# Patient Record
Sex: FEMALE | Race: WHITE | NOT HISPANIC OR LATINO | Employment: PART TIME | URBAN - METROPOLITAN AREA
[De-identification: names, ages, dates, MRNs, and addresses within clinical notes are randomized per-mention and may not be internally consistent; named-entity substitution may affect disease eponyms.]

---

## 2018-07-22 ENCOUNTER — APPOINTMENT (OUTPATIENT)
Dept: RADIOLOGY | Facility: MEDICAL CENTER | Age: 58
End: 2018-07-22
Attending: EMERGENCY MEDICINE
Payer: COMMERCIAL

## 2018-07-22 ENCOUNTER — HOSPITAL ENCOUNTER (OUTPATIENT)
Facility: MEDICAL CENTER | Age: 58
End: 2018-07-23
Attending: EMERGENCY MEDICINE | Admitting: HOSPITALIST
Payer: COMMERCIAL

## 2018-07-22 DIAGNOSIS — R07.9 ACUTE CHEST PAIN: ICD-10-CM

## 2018-07-22 DIAGNOSIS — I44.7 LBBB (LEFT BUNDLE BRANCH BLOCK): ICD-10-CM

## 2018-07-22 LAB
ALBUMIN SERPL BCP-MCNC: 4.3 G/DL (ref 3.2–4.9)
ALBUMIN/GLOB SERPL: 1.6 G/DL
ALP SERPL-CCNC: 91 U/L (ref 30–99)
ALT SERPL-CCNC: 16 U/L (ref 2–50)
ANION GAP SERPL CALC-SCNC: 14 MMOL/L (ref 0–11.9)
APTT PPP: 29 SEC (ref 24.7–36)
AST SERPL-CCNC: 24 U/L (ref 12–45)
BASOPHILS # BLD AUTO: 0.4 % (ref 0–1.8)
BASOPHILS # BLD: 0.03 K/UL (ref 0–0.12)
BILIRUB SERPL-MCNC: 0.4 MG/DL (ref 0.1–1.5)
BNP SERPL-MCNC: 42 PG/ML (ref 0–100)
BUN SERPL-MCNC: 11 MG/DL (ref 8–22)
CALCIUM SERPL-MCNC: 9 MG/DL (ref 8.5–10.5)
CHLORIDE SERPL-SCNC: 100 MMOL/L (ref 96–112)
CO2 SERPL-SCNC: 20 MMOL/L (ref 20–33)
CREAT SERPL-MCNC: 0.68 MG/DL (ref 0.5–1.4)
EKG IMPRESSION: NORMAL
EOSINOPHIL # BLD AUTO: 0.04 K/UL (ref 0–0.51)
EOSINOPHIL NFR BLD: 0.6 % (ref 0–6.9)
ERYTHROCYTE [DISTWIDTH] IN BLOOD BY AUTOMATED COUNT: 41.5 FL (ref 35.9–50)
GLOBULIN SER CALC-MCNC: 2.7 G/DL (ref 1.9–3.5)
GLUCOSE SERPL-MCNC: 100 MG/DL (ref 65–99)
HCT VFR BLD AUTO: 36 % (ref 37–47)
HGB BLD-MCNC: 12.3 G/DL (ref 12–16)
IMM GRANULOCYTES # BLD AUTO: 0.06 K/UL (ref 0–0.11)
IMM GRANULOCYTES NFR BLD AUTO: 0.8 % (ref 0–0.9)
INR PPP: 1.03 (ref 0.87–1.13)
LIPASE SERPL-CCNC: 11 U/L (ref 11–82)
LV EJECT FRACT  99904: 40
LV EJECT FRACT MOD 2C 99903: 64.71
LV EJECT FRACT MOD 4C 99902: 49.93
LV EJECT FRACT MOD BP 99901: 56.76
LYMPHOCYTES # BLD AUTO: 0.95 K/UL (ref 1–4.8)
LYMPHOCYTES NFR BLD: 13.4 % (ref 22–41)
MAGNESIUM SERPL-MCNC: 1.9 MG/DL (ref 1.5–2.5)
MCH RBC QN AUTO: 31.5 PG (ref 27–33)
MCHC RBC AUTO-ENTMCNC: 34.2 G/DL (ref 33.6–35)
MCV RBC AUTO: 92.1 FL (ref 81.4–97.8)
MONOCYTES # BLD AUTO: 0.63 K/UL (ref 0–0.85)
MONOCYTES NFR BLD AUTO: 8.9 % (ref 0–13.4)
NEUTROPHILS # BLD AUTO: 5.4 K/UL (ref 2–7.15)
NEUTROPHILS NFR BLD: 75.9 % (ref 44–72)
NRBC # BLD AUTO: 0 K/UL
NRBC BLD-RTO: 0 /100 WBC
PLATELET # BLD AUTO: 198 K/UL (ref 164–446)
PMV BLD AUTO: 10.4 FL (ref 9–12.9)
POTASSIUM SERPL-SCNC: 3.8 MMOL/L (ref 3.6–5.5)
PROT SERPL-MCNC: 7 G/DL (ref 6–8.2)
PROTHROMBIN TIME: 13.2 SEC (ref 12–14.6)
RBC # BLD AUTO: 3.91 M/UL (ref 4.2–5.4)
SODIUM SERPL-SCNC: 134 MMOL/L (ref 135–145)
TROPONIN I SERPL-MCNC: <0.01 NG/ML (ref 0–0.04)
TSH SERPL DL<=0.005 MIU/L-ACNC: 0.84 UIU/ML (ref 0.38–5.33)
WBC # BLD AUTO: 7.1 K/UL (ref 4.8–10.8)

## 2018-07-22 PROCEDURE — 71275 CT ANGIOGRAPHY CHEST: CPT

## 2018-07-22 PROCEDURE — 84443 ASSAY THYROID STIM HORMONE: CPT

## 2018-07-22 PROCEDURE — 93306 TTE W/DOPPLER COMPLETE: CPT | Mod: 26 | Performed by: INTERNAL MEDICINE

## 2018-07-22 PROCEDURE — 93010 ELECTROCARDIOGRAM REPORT: CPT | Performed by: INTERNAL MEDICINE

## 2018-07-22 PROCEDURE — 83880 ASSAY OF NATRIURETIC PEPTIDE: CPT

## 2018-07-22 PROCEDURE — 700117 HCHG RX CONTRAST REV CODE 255: Performed by: EMERGENCY MEDICINE

## 2018-07-22 PROCEDURE — 93005 ELECTROCARDIOGRAM TRACING: CPT

## 2018-07-22 PROCEDURE — 99285 EMERGENCY DEPT VISIT HI MDM: CPT

## 2018-07-22 PROCEDURE — 84484 ASSAY OF TROPONIN QUANT: CPT | Mod: 91

## 2018-07-22 PROCEDURE — 85025 COMPLETE CBC W/AUTO DIFF WBC: CPT

## 2018-07-22 PROCEDURE — 93005 ELECTROCARDIOGRAM TRACING: CPT | Performed by: EMERGENCY MEDICINE

## 2018-07-22 PROCEDURE — 36415 COLL VENOUS BLD VENIPUNCTURE: CPT

## 2018-07-22 PROCEDURE — 80053 COMPREHEN METABOLIC PANEL: CPT

## 2018-07-22 PROCEDURE — A9270 NON-COVERED ITEM OR SERVICE: HCPCS | Performed by: NURSE PRACTITIONER

## 2018-07-22 PROCEDURE — 93306 TTE W/DOPPLER COMPLETE: CPT

## 2018-07-22 PROCEDURE — 85610 PROTHROMBIN TIME: CPT

## 2018-07-22 PROCEDURE — 93005 ELECTROCARDIOGRAM TRACING: CPT | Performed by: HOSPITALIST

## 2018-07-22 PROCEDURE — 83735 ASSAY OF MAGNESIUM: CPT

## 2018-07-22 PROCEDURE — G0378 HOSPITAL OBSERVATION PER HR: HCPCS

## 2018-07-22 PROCEDURE — 85730 THROMBOPLASTIN TIME PARTIAL: CPT

## 2018-07-22 PROCEDURE — 71045 X-RAY EXAM CHEST 1 VIEW: CPT

## 2018-07-22 PROCEDURE — 83690 ASSAY OF LIPASE: CPT

## 2018-07-22 PROCEDURE — 99219 PR INITIAL OBSERVATION CARE,LEVL II: CPT | Performed by: HOSPITALIST

## 2018-07-22 PROCEDURE — 700102 HCHG RX REV CODE 250 W/ 637 OVERRIDE(OP): Performed by: NURSE PRACTITIONER

## 2018-07-22 RX ORDER — ALPRAZOLAM 0.25 MG/1
0.25 TABLET ORAL EVERY 6 HOURS PRN
Status: DISCONTINUED | OUTPATIENT
Start: 2018-07-22 | End: 2018-07-23 | Stop reason: HOSPADM

## 2018-07-22 RX ORDER — ESTRADIOL 0.1 MG/G
1 CREAM VAGINAL
COMMUNITY

## 2018-07-22 RX ORDER — LEVOTHYROXINE SODIUM 0.15 MG/1
150 TABLET ORAL
COMMUNITY

## 2018-07-22 RX ORDER — PROMETHAZINE HYDROCHLORIDE 25 MG/1
12.5-25 TABLET ORAL EVERY 4 HOURS PRN
Status: DISCONTINUED | OUTPATIENT
Start: 2018-07-22 | End: 2018-07-23 | Stop reason: HOSPADM

## 2018-07-22 RX ORDER — ACETAMINOPHEN 500 MG
1000 TABLET ORAL 2 TIMES DAILY PRN
COMMUNITY

## 2018-07-22 RX ORDER — BISACODYL 10 MG
10 SUPPOSITORY, RECTAL RECTAL
Status: DISCONTINUED | OUTPATIENT
Start: 2018-07-22 | End: 2018-07-23 | Stop reason: HOSPADM

## 2018-07-22 RX ORDER — LISINOPRIL 20 MG/1
20 TABLET ORAL
Status: DISCONTINUED | OUTPATIENT
Start: 2018-07-23 | End: 2018-07-23 | Stop reason: HOSPADM

## 2018-07-22 RX ORDER — ACETAMINOPHEN 325 MG/1
650 TABLET ORAL EVERY 6 HOURS PRN
Status: DISCONTINUED | OUTPATIENT
Start: 2018-07-22 | End: 2018-07-23 | Stop reason: HOSPADM

## 2018-07-22 RX ORDER — LORAZEPAM 2 MG/ML
0.5 INJECTION INTRAMUSCULAR ONCE
Status: COMPLETED | OUTPATIENT
Start: 2018-07-22 | End: 2018-07-23

## 2018-07-22 RX ORDER — DOXYCYCLINE 100 MG/1
100 TABLET ORAL EVERY 12 HOURS
Status: DISCONTINUED | OUTPATIENT
Start: 2018-07-22 | End: 2018-07-23 | Stop reason: HOSPADM

## 2018-07-22 RX ORDER — ENALAPRILAT 1.25 MG/ML
1.25 INJECTION INTRAVENOUS EVERY 6 HOURS PRN
Status: DISCONTINUED | OUTPATIENT
Start: 2018-07-22 | End: 2018-07-23 | Stop reason: HOSPADM

## 2018-07-22 RX ORDER — POLYETHYLENE GLYCOL 3350 17 G/17G
1 POWDER, FOR SOLUTION ORAL
Status: DISCONTINUED | OUTPATIENT
Start: 2018-07-22 | End: 2018-07-23 | Stop reason: HOSPADM

## 2018-07-22 RX ORDER — AMOXICILLIN 250 MG
2 CAPSULE ORAL 2 TIMES DAILY
Status: DISCONTINUED | OUTPATIENT
Start: 2018-07-22 | End: 2018-07-23 | Stop reason: HOSPADM

## 2018-07-22 RX ORDER — METRONIDAZOLE 10 MG/G
1 GEL TOPICAL DAILY
COMMUNITY

## 2018-07-22 RX ORDER — PROMETHAZINE HYDROCHLORIDE 25 MG/1
12.5-25 SUPPOSITORY RECTAL EVERY 4 HOURS PRN
Status: DISCONTINUED | OUTPATIENT
Start: 2018-07-22 | End: 2018-07-23 | Stop reason: HOSPADM

## 2018-07-22 RX ORDER — ONDANSETRON 4 MG/1
4 TABLET, ORALLY DISINTEGRATING ORAL EVERY 4 HOURS PRN
Status: DISCONTINUED | OUTPATIENT
Start: 2018-07-22 | End: 2018-07-23 | Stop reason: HOSPADM

## 2018-07-22 RX ORDER — LEVOTHYROXINE SODIUM 0.15 MG/1
150 TABLET ORAL
Status: DISCONTINUED | OUTPATIENT
Start: 2018-07-23 | End: 2018-07-23 | Stop reason: HOSPADM

## 2018-07-22 RX ORDER — ONDANSETRON 2 MG/ML
4 INJECTION INTRAMUSCULAR; INTRAVENOUS EVERY 4 HOURS PRN
Status: DISCONTINUED | OUTPATIENT
Start: 2018-07-22 | End: 2018-07-23 | Stop reason: HOSPADM

## 2018-07-22 RX ADMIN — IOHEXOL 100 ML: 350 INJECTION, SOLUTION INTRAVENOUS at 16:09

## 2018-07-22 RX ADMIN — ALPRAZOLAM 0.25 MG: 0.25 TABLET ORAL at 18:18

## 2018-07-22 ASSESSMENT — LIFESTYLE VARIABLES
TOTAL SCORE: 0
HOW MANY TIMES IN THE PAST YEAR HAVE YOU HAD 5 OR MORE DRINKS IN A DAY: 0
HAVE PEOPLE ANNOYED YOU BY CRITICIZING YOUR DRINKING: NO
AVERAGE NUMBER OF DAYS PER WEEK YOU HAVE A DRINK CONTAINING ALCOHOL: 3
ALCOHOL_USE: YES
CONSUMPTION TOTAL: NEGATIVE
EVER_SMOKED: NEVER
TOTAL SCORE: 0
HAVE YOU EVER FELT YOU SHOULD CUT DOWN ON YOUR DRINKING: NO
TOTAL SCORE: 0
EVER HAD A DRINK FIRST THING IN THE MORNING TO STEADY YOUR NERVES TO GET RID OF A HANGOVER: NO
EVER FELT BAD OR GUILTY ABOUT YOUR DRINKING: NO
EVER_SMOKED: NEVER
ON A TYPICAL DAY WHEN YOU DRINK ALCOHOL HOW MANY DRINKS DO YOU HAVE: 2

## 2018-07-22 ASSESSMENT — PATIENT HEALTH QUESTIONNAIRE - PHQ9
1. LITTLE INTEREST OR PLEASURE IN DOING THINGS: NOT AT ALL
SUM OF ALL RESPONSES TO PHQ9 QUESTIONS 1 AND 2: 0
2. FEELING DOWN, DEPRESSED, IRRITABLE, OR HOPELESS: NOT AT ALL

## 2018-07-22 ASSESSMENT — COPD QUESTIONNAIRES
IN THE PAST 12 MONTHS DO YOU DO LESS THAN YOU USED TO BECAUSE OF YOUR BREATHING PROBLEMS: DISAGREE/UNSURE
DO YOU EVER COUGH UP ANY MUCUS OR PHLEGM?: NO/ONLY WITH OCCASIONAL COLDS OR INFECTIONS
DO YOU EVER COUGH UP ANY MUCUS OR PHLEGM?: NO/ONLY WITH OCCASIONAL COLDS OR INFECTIONS
HAVE YOU SMOKED AT LEAST 100 CIGARETTES IN YOUR ENTIRE LIFE: NO/DON'T KNOW
DURING THE PAST 4 WEEKS HOW MUCH DID YOU FEEL SHORT OF BREATH: NONE/LITTLE OF THE TIME
DURING THE PAST 4 WEEKS HOW MUCH DID YOU FEEL SHORT OF BREATH: NONE/LITTLE OF THE TIME
COPD SCREENING SCORE: 0
COPD SCREENING SCORE: 1
HAVE YOU SMOKED AT LEAST 100 CIGARETTES IN YOUR ENTIRE LIFE: NO/DON'T KNOW

## 2018-07-22 ASSESSMENT — PAIN SCALES - GENERAL
PAINLEVEL_OUTOF10: 0
PAINLEVEL_OUTOF10: 0

## 2018-07-22 NOTE — ED NOTES
Pt BIB EMS. Pt started feeling anxious, had diarrhea, felt nauseous and dizzy, and had SOB and chest tightness. Sx have resolved but pt still has numbness and tingling in bilateral arms.  12 lead showed a new LBBB. Pt had been drinking last night and had coffee this morning, which is not normal for her. Similar episode once before.     Chief Complaint   Patient presents with   • Numbness   • Tingling

## 2018-07-23 ENCOUNTER — APPOINTMENT (OUTPATIENT)
Dept: RADIOLOGY | Facility: MEDICAL CENTER | Age: 58
End: 2018-07-23
Attending: HOSPITALIST
Payer: COMMERCIAL

## 2018-07-23 ENCOUNTER — PATIENT OUTREACH (OUTPATIENT)
Dept: HEALTH INFORMATION MANAGEMENT | Facility: OTHER | Age: 58
End: 2018-07-23

## 2018-07-23 VITALS
TEMPERATURE: 98.7 F | RESPIRATION RATE: 13 BRPM | SYSTOLIC BLOOD PRESSURE: 108 MMHG | HEIGHT: 65 IN | HEART RATE: 88 BPM | WEIGHT: 134.26 LBS | OXYGEN SATURATION: 98 % | DIASTOLIC BLOOD PRESSURE: 53 MMHG | BODY MASS INDEX: 22.37 KG/M2

## 2018-07-23 PROBLEM — F41.9 ANXIETY: Status: ACTIVE | Noted: 2018-07-23

## 2018-07-23 PROBLEM — R07.82 INTERCOSTAL PAIN: Status: ACTIVE | Noted: 2018-07-23

## 2018-07-23 PROBLEM — J20.8 ACUTE BRONCHITIS DUE TO OTHER SPECIFIED ORGANISMS: Status: ACTIVE | Noted: 2018-07-23

## 2018-07-23 PROBLEM — E03.4 HYPOTHYROIDISM DUE TO ACQUIRED ATROPHY OF THYROID: Status: ACTIVE | Noted: 2018-07-23

## 2018-07-23 PROBLEM — I10 ESSENTIAL HYPERTENSION: Status: ACTIVE | Noted: 2018-07-23

## 2018-07-23 LAB — EKG IMPRESSION: NORMAL

## 2018-07-23 PROCEDURE — 700102 HCHG RX REV CODE 250 W/ 637 OVERRIDE(OP): Performed by: HOSPITALIST

## 2018-07-23 PROCEDURE — G0378 HOSPITAL OBSERVATION PER HR: HCPCS

## 2018-07-23 PROCEDURE — 99217 PR OBSERVATION CARE DISCHARGE: CPT | Performed by: HOSPITALIST

## 2018-07-23 PROCEDURE — 700111 HCHG RX REV CODE 636 W/ 250 OVERRIDE (IP)

## 2018-07-23 PROCEDURE — A9502 TC99M TETROFOSMIN: HCPCS

## 2018-07-23 PROCEDURE — 700111 HCHG RX REV CODE 636 W/ 250 OVERRIDE (IP): Performed by: HOSPITALIST

## 2018-07-23 PROCEDURE — A9270 NON-COVERED ITEM OR SERVICE: HCPCS | Performed by: HOSPITALIST

## 2018-07-23 PROCEDURE — 96374 THER/PROPH/DIAG INJ IV PUSH: CPT

## 2018-07-23 RX ORDER — METOPROLOL SUCCINATE 25 MG/1
25 TABLET, EXTENDED RELEASE ORAL DAILY
Qty: 30 TAB | Refills: 3 | Status: SHIPPED | OUTPATIENT
Start: 2018-07-23

## 2018-07-23 RX ORDER — REGADENOSON 0.08 MG/ML
INJECTION, SOLUTION INTRAVENOUS
Status: COMPLETED
Start: 2018-07-23 | End: 2018-07-23

## 2018-07-23 RX ORDER — ATORVASTATIN CALCIUM 40 MG/1
40 TABLET, FILM COATED ORAL DAILY
Qty: 30 TAB | Refills: 1 | Status: SHIPPED | OUTPATIENT
Start: 2018-07-23

## 2018-07-23 RX ORDER — DOXYCYCLINE 100 MG/1
100 TABLET ORAL EVERY 12 HOURS
Qty: 10 TAB | Refills: 0 | Status: SHIPPED | OUTPATIENT
Start: 2018-07-23

## 2018-07-23 RX ORDER — LISINOPRIL 10 MG/1
10 TABLET ORAL DAILY
Qty: 30 TAB | Refills: 1 | Status: SHIPPED | OUTPATIENT
Start: 2018-07-24

## 2018-07-23 RX ADMIN — LEVOTHYROXINE SODIUM 150 MCG: 150 TABLET ORAL at 06:34

## 2018-07-23 RX ADMIN — LORAZEPAM 0.5 MG: 2 INJECTION INTRAMUSCULAR; INTRAVENOUS at 07:11

## 2018-07-23 RX ADMIN — LISINOPRIL 20 MG: 20 TABLET ORAL at 06:34

## 2018-07-23 RX ADMIN — REGADENOSON 0.4 MG: 0.08 INJECTION, SOLUTION INTRAVENOUS at 08:02

## 2018-07-23 ASSESSMENT — ENCOUNTER SYMPTOMS
VOMITING: 0
NAUSEA: 0
SENSORY CHANGE: 1
DIARRHEA: 0
SPUTUM PRODUCTION: 1
SHORTNESS OF BREATH: 1
WEIGHT LOSS: 0
ABDOMINAL PAIN: 0
CHILLS: 0
COUGH: 1
DIAPHORESIS: 0
PALPITATIONS: 0
EYE PAIN: 0
NERVOUS/ANXIOUS: 1
SORE THROAT: 1
BLURRED VISION: 0
HEARTBURN: 0
FEVER: 0
ORTHOPNEA: 0
PHOTOPHOBIA: 0
DOUBLE VISION: 0

## 2018-07-23 ASSESSMENT — PAIN SCALES - GENERAL: PAINLEVEL_OUTOF10: 0

## 2018-07-23 NOTE — PROGRESS NOTES
Pt is resting/sleeping. Call light within reach. On SR w LBBB rhythm, HR is 80's. VS stable. No other needs at this time. Will continue to monitor

## 2018-07-23 NOTE — CARE PLAN
Problem: Communication  Goal: The ability to communicate needs accurately and effectively will improve  Outcome: PROGRESSING AS EXPECTED      Problem: Safety  Goal: Will remain free from injury  Outcome: PROGRESSING AS EXPECTED      Problem: Pain Management  Goal: Pain level will decrease to patient's comfort goal  Outcome: PROGRESSING AS EXPECTED

## 2018-07-23 NOTE — DISCHARGE SUMMARY
"Discharge Summary    CHIEF COMPLAINT ON ADMISSION  Chief Complaint   Patient presents with   • Numbness   • Tingling   • Chest Pain       Reason for Admission  Chest pain    Admission Date  7/22/2018    CODE STATUS  Full Code    HPI & HOSPITAL COURSE  58 y.o. female who presented 7/22/2018 with  Chest tightness. It started around 1030 am. It was associated with bilateral arm numbness. She was also short of breath.She  Was nauseous .she had one  Episode of diarrhea.Her  describes her as an anxious person. She was anxious during this episode. In the ER, her BP was elevated and remain elevated. She has a family hx of MI.she states she had been dealing with a\"viral issue\" for about a week and now she is coughing up greenish sputum. No fever or chills.her EKG show new LBBB.    She was started on po doxycycline for suspected acute bronchitis. Echo showed akinesis anteroseptal wall. Ef 40%. Stress test shows old infarct. No reversible ischemia    He bp was elevated during her admission    She was anxious    With findings c/w old heart attack and reduced cardiac function and elevated bp--> she was started on asa, statin, aceI, b blocker     Therefore, she is discharged in good and stable condition to home with close outpatient follow-up.    The patient recovered much more quickly than anticipated on admission.    Discharge Date  7/23    FOLLOW UP ITEMS POST DISCHARGE  pcp 1 week    DISCHARGE DIAGNOSES  Principal Problem:    Intercostal pain POA: Yes  Active Problems:    Essential hypertension POA: Yes    Hypothyroidism due to acquired atrophy of thyroid POA: Yes    Acute bronchitis due to other specified organisms POA: Yes    Anxiety POA: Yes  Resolved Problems:    * No resolved hospital problems. *      FOLLOW UP  No future appointments.  No follow-up provider specified.    MEDICATIONS ON DISCHARGE     Medication List      START taking these medications      Instructions   aspirin  MG Tbec  Commonly known as:  " ECOTRIN   Take 1 Tab by mouth every day.  Dose:  325 mg     atorvastatin 40 MG Tabs  Commonly known as:  LIPITOR   Take 1 Tab by mouth every day.  Dose:  40 mg     doxycycline monohydrate 100 MG tablet  Commonly known as:  ADOXA   Take 1 Tab by mouth every 12 hours.  Dose:  100 mg     lisinopril 10 MG Tabs  Start taking on:  2018  Commonly known as:  PRINIVIL   Take 1 Tab by mouth every day.  Dose:  10 mg     metoprolol SR 25 MG Tb24  Commonly known as:  TOPROL XL   Take 1 Tab by mouth every day.  Dose:  25 mg        CONTINUE taking these medications      Instructions   acetaminophen 500 MG Tabs  Commonly known as:  TYLENOL   Take 1,000 mg by mouth 2 times a day as needed (pain).  Dose:  1000 mg     ESTRACE VAGINAL 0.1 MG/GM vaginal cream  Generic drug:  estradiol   Insert 1 g in vagina every 7 days.   Dose:  1 g     levothyroxine 150 MCG Tabs  Commonly known as:  SYNTHROID   Take 150 mcg by mouth Every morning on an empty stomach.  Dose:  150 mcg     METROGEL 1 % gel  Generic drug:  metronidazole   Apply 1 Application to affected area(s) every day. face  Dose:  1 Application            Allergies  Allergies   Allergen Reactions   • Fosamax    • Lidocaine    • Sulfa Drugs        DIET  Orders Placed This Encounter   Procedures   • Diet Order Cardiac     Standing Status:   Standing     Number of Occurrences:   1     Order Specific Question:   Diet:     Answer:   Cardiac [6]     Order Specific Question:   Miscellaneous modifications:     Answer:   Gluten Free per PT [10]     Order Specific Question:   Miscellaneous modifications:     Answer:   No Decaf, No Caffeine(for test) [11]       ACTIVITY  As tolerated.  Weight bearing as tolerated    CONSULTATIONS  Dr aviles cardiology    PROCEDURES  Patient Information     Patient Name  Melanie Marie ([id number]) Sex  Female   1960   Room Bed Code Status Current Location   T207 00 FULL 00   Reprint Order Requisition     NM-CARDIAC STRESS TEST (Order  #[id number]) on 18   Linked Documents     View [facility] Report   Last Resulted Time     9:21 AM   Images     Show images for NM-CARDIAC STRESS TEST   Imaging Result Status     Status: Edited Result - FINAL (Exam End: 2018  9:04 AM)   Imaging Previous Results     Open Hard Copy Result Report (Order #[id number] - NM-CARDIAC STRESS TEST)   Narrative                      Myocardial Perfusion   Report   NUCLEAR IMAGING INTERPRETATION   No reversible defects that would indicate ischemia.    Non-reversible defects noted consistent with infarcts.   Mildly reduced left ventricular systolic function.    ECG INTERPRETATION   Nondiagnostic for ischemia due to left bundle branch block.     KATIE AMATO     MRN:    [id number]         Gender:    F     Exam Date: 2018 07:23     Exam Location:      Inpatient     Ordering Phys:     DANA FRANCES     NucMed Tech:       Melvi Peres, RT                       (N)     Age:    58    :    1960        Ht (in):     65     Wt (lb):     134    BMI:    22.32       Radiologist     Risk Factors:             Family history of coronary disease     Indications:              Chest pain, unspecified     ICD Codes:                R079     Cardiac History:          Positive risk factors     Cardiac Meds:     Meds Past 24 hrs:     Pretest Chest Pain:     STRESS TEST      Pharmacologi                    c   Levy   Lexiscan       Dose: 0.4 mg   ol:                       Post-Injection Exercise:               Resting HR (bpm):      83     Peak HR (bpm):         100     Resting BP (mmHg):       134    /   89     Peak BP (mmHg):       150   /   70     MaxPHR:     164     Target HR (bpm):       139     % MaxPHR:     61     Double Product:       73103     BP Response:     Stress Termination:     Stress Symptoms:   Arrhythmia: None Chest Pain: None Resting ECG LBBB     ECG     Resting ECG:     Sinus rhythm with left bundle branch block.     Stress ECG:       Nondiagnostic due to left bundle branch block.     IMAGE PROTOCOL      Rest/Stress 1                        Day             RadiopharmaceuticalDose (mCi)   Imaging  Date      Imaging  Time           Inj to Img Time (min)   Rest:   Tc-99m             7.3          23-Jul-2018        07:42                   15           Tetrofosmin   Stress: Tc-99m             25.2         23-Jul-2018        08:34                   15           Tetrofosmin     Rest:   Administration Site:       Right antecubital                               fossa   Administered by:      Melvi Peres RT (N)     Stress:   Administration Site:       Right antecubital                               fossa   Administered by:      Keesha Luis RN     % Percent HR Achieved:   SPECT RESULTS     Technical Quality:       Excellent     Raw Data Analysis:   Summed Stress Score:    4   Summed Rest Score:    0   Summed Difference Score:        10   PERFUSION:   Small sized, nonreversible, decreased uptake of moderate severity in the mid    anteroseptal (LAD) segment during post stress images.     Small sized, nonreversible, decreased uptake of moderate severity in the mid    inferoseptal (RCA) segment during post stress images.     Homogenous normal tracer uptake of the myocardium during rest and stress in    all other segments     FUNCTIONAL RESULTS (calculated via Gated SPECT)     Stress Image LV EF:        45     %     Upper Normal Limit     Stress EDV:      103    ml   EDVI:    62      ml/m²     Stress ESV:      57     ml   ESVI:    34      ml/m²     TID:    0.95   TID - 1.19      TID (ed) - 1.23   LV Function:   Gated images show:        - a large sized area of dyskinesis in the anterior (LAD), apical septal    (LAD) and anteroseptal (LAD) segments        - a medium sized area of akinesis in the inferoseptal (RCA), mid    inferior (RCA) and basal inferior (RCA) segments                   Sanjeev An   Edited by: Michelle Jefferson MD   (Electronically  Signed)   Final Date:      2018                     09:11   Amended:         2018 09:21   Reading Provider Reading Date   No Reading Provider Prelim 2018     -------------------------------------------------------------------------------------    Order   ECHOCARDIOGRAM COMP W/O CONT [[id number]] (Order [id number])   Patient Information     Patient Name  Melanie Amato ([id number]) Sex  Female   1960   Room Bed Code Status Current Location   T207 00 FULL 00   Reprint Order Requisition     ECHOCARDIOGRAM COMP W/O CONT (Order #[id number]) on 18   Linked Documents     View [facility] Report   Last Resulted Time   Sun 2018  6:04 PM   Images     Show images for ECHOCARDIOGRAM COMP W/O CONT   Imaging Result Status     Status: Final result (Collected: 2018  4:51 PM)   Imaging Previous Results     Open Hard Copy Result Report (Order #[id number] - ECHOCARDIOGRAM COMP W/O CONT)   Narrative     Transthoracic  Echo Report      Echocardiography Laboratory    CONCLUSIONS  Moderately reduced left ventricular systolic function.  Left ventricular ejection fraction is visually estimated to be 40%.  There is akinesis of the anteroseptal wall.  There is hypokinesis of the inferior and anterior wall.  Grade I diastolic dysfunction.  The right ventricle was normal in size and function.  Mild mitral regurgitation.  Normal estimated right atrial pressure.    No prior study is available for comparison.       MELANIE AMATO  Exam Date:         2018                      16:51  Exam Location:     Inpatient  Priority:          Routine    Ordering Physician:        BRII LUX                               ([id number])  Referring Physician:       [id number] J Hill  Sonographer:               Libertad Combs RDCS    Age:    58     Gender:    F  MRN:    [id number]  :    1960  BSA:    1.67   Ht (in):    65     Wt (lb):    134  Exam Type:     Complete    Indications:     Chest pain,  unspecified  ICD Codes:       R079    CPT Codes:       85096    BP:          /          HR:   86  Technical Quality:       Fair    MEASUREMENTS  (Male / Female) Normal Values  2D ECHO  LV Diastolic Diameter PLAX        3.9 cm                4.2 - 5.9 / 3.9 - 5.3   cm  LV Systolic Diameter PLAX         3.2 cm                2.1 - 4.0 cm  IVS Diastolic Thickness           1.2 cm                  LVPW Diastolic Thickness          1 cm                    LVOT Diameter                     1.8 cm                  Estimated LV Ejection Fraction    40 %                    LV Ejection Fraction MOD BP       56.8 %                >= 55  %  LV Ejection Fraction MOD 4C       49.9 %                  LV Ejection Fraction MOD 2C       64.7 %                  IVC Diameter                      1.3 cm                    M-MODE  Aortic Root Diameter MM           2.7 cm                    DOPPLER  AV Peak Velocity                  1.7 m/s                 AV Peak Gradient                  12 mmHg                 AV Mean Gradient                  7.1 mmHg                LVOT Peak Velocity                1.1 m/s                 AV Area Cont Eq vti               1.6 cm²                 Mitral E Point Velocity           0.89 m/s                Mitral E to A Ratio               0.62                    Mitral A Duration                 90 ms                   MV Pressure Half Time             47.2 ms                 MV Area PHT                       4.7 cm²                 MV Deceleration Time              163 ms                  Pulmonary Vein Systolic Velocity  0.68 m/s                Pulmonary Vein Diastolic Velocit  0.42 m/s                Pulmonary Vein S/D Ratio          1.6                     Pulmonary Vein A Velocity         0.36 m/s                PV Peak Velocity                  1.3 m/s                 PV Peak Gradient                  6.3 mmHg                RVOT Peak Velocity                1.1 m/s                   * Indicates  values subject to auto-interpretation  LV EF:  40    %    FINDINGS  Left Ventricle  Normal left ventricular chamber size. Normal left ventricular wall   thickness. Moderately reduced left ventricular systolic function. Left   ventricular ejection fraction is visually estimated to be 40%. There is   akinesis of the anteroseptal wall.  There is hypokinesis of the   inferior and anterior wall.  Grade I diastolic dysfunction.    Right Ventricle  The right ventricle was normal in size and function.    Right Atrium  The right atrium is normal in size.  Normal inferior vena cava size and   inspiratory collapse. Normal estimated right atrial pressure.    Left Atrium  The left atrium is normal in size.  Left atrial volume index is 23   mL/sq m.    Mitral Valve  Structurally normal mitral valve without significant stenosis. Mild   mitral regurgitation.    Aortic Valve  Structurally normal aortic valve without significant stenosis or   regurgitation.    Tricuspid Valve  Structurally normal tricuspid valve without significant stenosis or   regurgitation. Unable to estimate pulmonary artery pressure due to an   inadequate tricuspid regurgitant jet.    Pulmonic Valve  Structurally normal pulmonic valve without significant stenosis or   regurgitation.    Pericardium  Normal pericardium without effusion.    Aorta  The aortic root is normal.                      Tejas Santamaria MD  (Electronically Signed)  Final Date:     22 July 2018                   18:04         LABORATORY  Lab Results   Component Value Date    SODIUM 134 (L) 07/22/2018    POTASSIUM 3.8 07/22/2018    CHLORIDE 100 07/22/2018    CO2 20 07/22/2018    GLUCOSE 100 (H) 07/22/2018    BUN 11 07/22/2018    CREATININE 0.68 07/22/2018        Lab Results   Component Value Date    WBC 7.1 07/22/2018    HEMOGLOBIN 12.3 07/22/2018    HEMATOCRIT 36.0 (L) 07/22/2018    PLATELETCT 198 07/22/2018        Total time of the discharge process exceeds 36 minutes.

## 2018-07-23 NOTE — PROGRESS NOTES
Received report from Gurpreet LAMAS. Assume Pt care. Pt is sitting up on bedside, family at bedside. Instructed to call for assistance. Will continue to monitor

## 2018-07-23 NOTE — DISCHARGE PLANNING
Care Transition Team Assessment    Met with pt at bedside. 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 Pt is independent at baseline, no needs identified at this time. Pt confirmed her  will provide transport at discharge.    Information Source  Orientation : Oriented x 4  Information Given By: Patient  Informant's Name: Melanie Marie    Readmission Evaluation  Is this a readmission?: No    Interdisciplinary Discharge Planning  Does Admitting Nurse Feel This Could be a Complex Discharge?: No  Primary Care Physician: Dr Mohr  Lives with - Patient's Self Care Capacity: Spouse, Parents, Adult Children  Patient or legal guardian wants to designate a caregiver (see row info): No  Support Systems: Spouse / Significant Other  Housing / Facility: [address]  Do You Take your Prescribed Medications Regularly: Yes  Able to Return to Previous ADL's: Yes  Mobility Issues: No  Prior Services: None  Patient Expects to be Discharged to:: Home  Assistance Needed: No  Durable Medical Equipment: Not Applicable    Discharge Preparedness  What are your discharge supports?: Spouse  Prior Functional Level: Ambulatory, Independent with Activities of Daily Living, Independent with Medication Management  Difficulity with ADLs: None  Difficulity with IADLs: None    Functional Assesment  Prior Functional Level: Ambulatory, Independent with Activities of Daily Living, Independent with Medication Management    Finances  Prescription Coverage: Yes    Discharge Risks or Barriers  Discharge risks or barriers?: No    Anticipated Discharge Information  Anticipated discharge disposition: Home  Discharge Address: [address] [place] [address]  Discharge Contact Phone Number: Spouse Woo [phone]

## 2018-07-23 NOTE — PROGRESS NOTES
Pt dc'd home. IV and monitor removed. Pt left unit via walking with , Refused hospital escort. Personal belongings with pt when leaving unit. Pt given discharge instructions prior to leaving unit including prescription and when to visit with physician; verbalizes understanding. Copy of discharge instructions with pt and in the chart.

## 2018-07-23 NOTE — PROGRESS NOTES
Admit profile and assessment completed.  Pt A&Ox4, anxious, medicated per MAR.  Respirations even, unlabored on room air.  Pt denies any pain at this time.  Pt denies any CP, SOB, nausea, palpitations at this time.  Monitors applied, sinus rhythm noted.  Pt updated on POC: trending troponin, chemical stress test.   Call light and belongings within reach. Communication board.  Family at bedside. Needs met, will continue to monitor.

## 2018-07-23 NOTE — PROGRESS NOTES
Seen pt, AOx 4. On cardiac monitor with SR w LBBB. Denies chest pain. Plan of care discussed includes [facility], labs, cardiac monitoring, stress test (diet/meds restrictions instructed) and pt understands.

## 2018-07-23 NOTE — ED PROVIDER NOTES
ED Provider Note    CHIEF COMPLAINT  Chief Complaint   Patient presents with   • Numbness   • Tingling   • Chest Pain       HPI  Melanie Marie is a 58 y.o. female who presents to emergency room today with complaint of chest pain/tightness, numbness and tingling difficulty breathing. Patient's symptoms started around 10 to 10:30 AM. And numbness to her arms and legs, felt like she was going pass out with tightness to her chest cannot breathe. Wall pain which radiated to her back between her shoulder blades and upper neck described as tearing. Time to arrived by ambulance to the emergency room she felt improved and better her symptoms were not completely gone. Is noted that she had a left bundle-branch block on her EKG.    REVIEW OF SYSTEMS  See HPI for further details. All other systems are negative.     PAST MEDICAL HISTORY  No past medical history on file.    FAMILY HISTORY  [unfilled]    SOCIAL HISTORY  Social History     Social History   • Marital status:      Spouse name: N/A   • Number of children: N/A   • Years of education: N/A     Social History Main Topics   • Smoking status: Never Smoker   • Smokeless tobacco: Never Used   • Alcohol use Yes      Comment: 3-4x/week   • Drug use: No   • Sexual activity: Not on file     Other Topics Concern   • Not on file     Social History Narrative   • No narrative on file       SURGICAL HISTORY  No past surgical history on file.    CURRENT MEDICATIONS  Home Medications     Reviewed by Pam Zapata (Pharmacy Tech) on 07/22/18 at 1715  Med List Status: Complete   Medication Last Dose Status   acetaminophen (TYLENOL) 500 MG Tab 7/19/2018 Active   estradiol (ESTRACE VAGINAL) 0.1 MG/GM vaginal cream 7/18/2018 Active   levothyroxine (SYNTHROID) 150 MCG Tab 7/22/2018 Active   metronidazole (METROGEL) 1 % gel > 3 days Active                ALLERGIES  Allergies   Allergen Reactions   • Fosamax    • Lidocaine    • Sulfa Drugs        PHYSICAL EXAM  VITAL SIGNS: BP  "141/70   Pulse 82   Temp 36.8 °C (98.2 °F)   Resp 18   Ht 1.651 m (5' 5\")   Wt 60.9 kg (134 lb 4.2 oz)   SpO2 98%   Breastfeeding? No   BMI 22.34 kg/m²  Room air O2: 99    Constitutional: Well developed, Well nourished, acute distress, Non-toxic appearance.   HENT: Normocephalic, Atraumatic, Bilateral external ears normal, Oropharynx moist, No oral exudates, Nose normal.   Eyes: PERRLA, EOMI, Conjunctiva normal, No discharge.   Neck: Normal range of motion, No tenderness, Supple, No stridor.   Lymphatic: No lymphadenopathy noted.   Cardiovascular: Normal heart rate, Normal rhythm, No murmurs, No rubs, No gallops.   Thorax & Lungs: Normal breath sounds, No respiratory distress, No wheezing, No chest tenderness.   Abdomen: Bowel sounds normal, Soft, No tenderness, No masses, No pulsatile masses.   Skin: Warm, Dry, No erythema, No rash.   Back: No tenderness, No CVA tenderness.   Extremities: Intact distal pulses, No edema, No tenderness, No cyanosis, No clubbing.   Musculoskeletal: Good range of motion in all major joints. No tenderness to palpation or major deformities noted.   Neurologic: Alert & oriented x 3, Normal motor function, Normal sensory function, No focal deficits noted.   Psychiatric: Affect normal, Judgment normal, Mood anxious.     EKG  Normal sinus rhythm 80 beats per minute, no ST elevation or depression, widening of the QRS complex, poorly progression and left axis deviation secondary to left bundle-branch block, [place] intervals are normal. No diagnostic Q waves are noted. As compared to previous EKG dated 2013 there is interval change a left bundle branch block.    RADIOLOGY/PROCEDURES  ECHOCARDIOGRAM COMP W/O CONT   Final Result      CT-CTA CHEST WITH & W/O-POST PROCESS   Final Result      Unremarkable CT scan of the thoracic aorta without evidence of aneurysm or dissection.      Left lower lobe atelectasis or scarring.         DX-CHEST-LIMITED (1 VIEW)   Final Result         No acute " cardiopulmonary abnormalities are identified.            COURSE & MEDICAL DECISION MAKING  Pertinent Labs & Imaging studies reviewed. (See chart for details)  Patient is new left bundle-branch block with chest pain she'll be admitted to hospital for echocardiogram and stress test, discussed with cardiology as well as with hospitalist. Discussed with patient's physician through [facility] they agree with the above treatment. EKG as stated in his left bundle-branch block troponin so far normal. Patient's multiple risk factors which include family history mother/uncle. Consults her and also was possible dissection so CT scan was performed and this was negative she had tearing back pain between her shoulder blades.    FINAL IMPRESSION  1. Acute chest pain  2. Left bundle-branch block  3.         Electronically signed by: Martin Hawkins, 7/22/2018 6:53 PM

## 2018-07-23 NOTE — DISCHARGE INSTRUCTIONS
Discharge Instructions    Discharged to home by car with relative. Discharged via walking, hospital escort: Refused.  Special equipment needed: Not Applicable    Be sure to schedule a follow-up appointment with your primary care doctor or any specialists as instructed.     Discharge Plan:   Diet Plan: Discussed  Activity Level: Discussed  Confirmed Follow up Appointment: Patient to Call and Schedule Appointment  Confirmed Symptoms Management: Discussed  Medication Reconciliation Updated: Yes  Influenza Vaccine Indication: Patient Refuses    I understand that a diet low in cholesterol, fat, and sodium is recommended for good health. Unless I have been given specific instructions below for another diet, I accept this instruction as my diet prescription.   Other diet: Heart Healthy     Special Instructions: None    · Is patient discharged on Warfarin / Coumadin?   No     Depression / Suicide Risk    As you are discharged from this [facility] facility, it is important to learn how to keep safe from harming yourself.    Recognize the warning signs:  · Abrupt changes in personality, positive or negative- including increase in energy   · Giving away possessions  · Change in eating patterns- significant weight changes-  positive or negative  · Change in sleeping patterns- unable to sleep or sleeping all the time   · Unwillingness or inability to communicate  · Depression  · Unusual sadness, discouragement and loneliness  · Talk of wanting to die  · Neglect of personal appearance   · Rebelliousness- reckless behavior  · Withdrawal from people/activities they love  · Confusion- inability to concentrate     If you or a loved one observes any of these behaviors or has concerns about self-harm, here's what you can do:  · Talk about it- your feelings and reasons for harming yourself  · Remove any means that you might use to hurt yourself (examples: pills, rope, extension cords, firearm)  · Get professional help from the  community (Mental Health, Substance Abuse, psychological counseling)  · Do not be alone:Call your Safe Contact- someone whom you trust who will be there for you.  · Call your local CRISIS HOTLINE 111-1389 or 292-203-0283  · Call your local Children's Mobile Crisis Response Team [place] (321) 020-3717 or www.[employer]  · Call the toll free National Suicide Prevention Hotlines   · National Suicide Prevention Lifeline 132-682-JXWH (9080)  · National Hope Line Network 800-SUICIDE (378-6053)      Chest Pain Observation  It is often hard to give a specific diagnosis for the cause of chest pain. Among other possibilities your symptoms might be caused by inadequate oxygen delivery to your heart (angina). Angina that is not treated or evaluated can lead to a heart attack (myocardial infarction) or death.  Blood tests, electrocardiograms, and X-rays may have been done to help determine a possible cause of your chest pain. After evaluation and observation, your health care provider has determined that it is unlikely your pain was caused by an unstable condition that requires hospitalization. However, a full evaluation of your pain may need to be completed, with additional diagnostic testing as directed. It is very important to keep your follow-up appointments. Not keeping your follow-up appointments could result in permanent heart damage, disability, or death. If there is any problem keeping your follow-up appointments, you must call your health care provider.  HOME CARE INSTRUCTIONS   Due to the slight chance that your pain could be angina, it is important to follow your health care provider's treatment plan and also maintain a healthy lifestyle:  · Maintain or work toward achieving a healthy weight.  · Stay physically active and exercise regularly.  · Decrease your salt intake.  · Eat a balanced, healthy diet. Talk to a dietitian to learn about heart-healthy foods.  · Increase your fiber intake by including  whole grains, vegetables, fruits, and nuts in your diet.  · Avoid situations that cause stress, anger, or depression.  · Take medicines as advised by your health care provider. Report any side effects to your health care provider. Do not stop medicines or adjust the dosages on your own.  · Quit smoking. Do not use nicotine patches or gum until you check with your health care provider.  · Keep your blood pressure, blood sugar, and cholesterol levels within normal limits.  · Limit alcohol intake to no more than 1 drink per day for women who are not pregnant and 2 drinks per day for men.  · Do not abuse drugs.  SEEK IMMEDIATE MEDICAL CARE IF:  You have severe chest pain or pressure which may include symptoms such as:  · You feel pain or pressure in your arms, neck, jaw, or back.  · You have severe back or abdominal pain, feel sick to your stomach (nauseous), or throw up (vomit).  · You are sweating profusely.  · You are having a fast or irregular heartbeat.  · You feel short of breath while at rest.  · You notice increasing shortness of breath during rest, sleep, or with activity.  · You have chest pain that does not get better after rest or after taking your usual medicine.  · You wake from sleep with chest pain.  · You are unable to sleep because you cannot breathe.  · You develop a frequent cough or you are coughing up blood.  · You feel dizzy, faint, or experience extreme fatigue.  · You develop severe weakness, dizziness, fainting, or chills.  Any of these symptoms may represent a serious problem that is an emergency. Do not wait to see if the symptoms will go away. Call your local emergency services (911 in the [place] Do not drive yourself to the hospital.  MAKE SURE YOU:  · Understand these instructions.  · Will watch your condition.  · Will get help right away if you are not doing well or get worse.     This information is not intended to replace advice given to you by your health care provider. Make sure you  discuss any questions you have with your health care provider.     Document Released: 01/20/2012 Document Revised: 12/23/2014 Document Reviewed: 06/19/2014  Elsevier Interactive Patient Education ©2016 [employer]

## 2018-07-23 NOTE — H&P
"Hospital Medicine History & Physical Note    Date of Service  7/22/2018    Primary Care Physician  [facility]    Consultants    None      Code Status  full    Chief Complaint  Chest pain    History of Presenting Illness  58 y.o. female who presented 7/22/2018 with  Chest tightness. It started around 1030 am. It was associated with bilateral arm numbness. She was also short of breath.She  Was nauseous .she had one  Episode of diarrhea.Her  describes her as an anxious person. She was anxious during this episode. In the ER, her BP was elevated and remain elevated. She has a family hx of MI.she states she had been dealing with a\"viral issue\" for about a week and now she is coughing up greenish sputum. No fever or chills.her EKG show new LBBB.    Review of Systems  Review of Systems   Constitutional: Negative for chills, diaphoresis, fever, malaise/fatigue and weight loss.   HENT: Positive for sore throat. Negative for ear discharge, ear pain, hearing loss and tinnitus.    Eyes: Negative for blurred vision, double vision, photophobia and pain.   Respiratory: Positive for cough, sputum production and shortness of breath.    Cardiovascular: Positive for chest pain. Negative for palpitations, orthopnea and leg swelling.   Gastrointestinal: Negative for abdominal pain, diarrhea, heartburn, nausea and vomiting.   Genitourinary: Negative for dysuria, frequency, hematuria and urgency.   Neurological: Positive for sensory change.   Psychiatric/Behavioral: The patient is nervous/anxious.    All other systems reviewed and are negative.      Past Medical History   has no past medical history on file.    Surgical History   has no past surgical history on file.     Family History  family history is not on file.     Social History   reports that she has never smoked. She has never used smokeless tobacco. She reports that she drinks alcohol. She reports that she does not use drugs.    Allergies  Allergies   Allergen " Reactions   • Fosamax    • Lidocaine    • Sulfa Drugs        Medications  Prior to Admission Medications   Prescriptions Last Dose Informant Patient Reported? Taking?   acetaminophen (TYLENOL) 500 MG Tab 7/19/2018 at PRN Patient Yes Yes   Sig: Take 1,000 mg by mouth 2 times a day as needed (pain).   estradiol (ESTRACE VAGINAL) 0.1 MG/GM vaginal cream 7/18/2018 at PM Patient Yes Yes   Sig: Insert 1 g in vagina every 7 days. Wednesdays   levothyroxine (SYNTHROID) 150 MCG Tab 7/22/2018 at 0900 Patient Yes Yes   Sig: Take 150 mcg by mouth Every morning on an empty stomach.   metronidazole (METROGEL) 1 % gel > 3 days at PM Patient Yes Yes   Sig: Apply 1 Application to affected area(s) every day. face      Facility-Administered Medications: None       Physical Exam  Blood Pressure: 134/77   Temperature: 36.8 °C (98.2 °F)   Pulse: 90   Respiration: 18   Pulse Oximetry: 97 %     Physical Exam   Constitutional: She is oriented to person, place, and time. No distress.   HENT:   Head: Normocephalic and atraumatic.   Mouth/Throat: No oropharyngeal exudate.   Eyes: EOM are normal. Pupils are equal, round, and reactive to light. Right eye exhibits no discharge. Left eye exhibits no discharge. No scleral icterus.   Neck: No JVD present. No tracheal deviation present. No thyromegaly present.   Cardiovascular: Normal rate and regular rhythm.  Exam reveals no gallop and no friction rub.    No murmur heard.  Pulmonary/Chest: Effort normal and breath sounds normal. No respiratory distress. She has no wheezes. She has no rales.   Abdominal: Soft. Bowel sounds are normal. She exhibits no distension. There is no tenderness. There is no rebound and no guarding.   Musculoskeletal: She exhibits no edema, tenderness or deformity.   Neurological: She is alert and oriented to person, place, and time. No cranial nerve deficit. Coordination normal.   Skin: No rash noted. She is not diaphoretic. No erythema. No pallor.       Laboratory:  Recent  Labs      07/22/18   1410   WBC  7.1   RBC  3.91*   HEMOGLOBIN  12.3   HEMATOCRIT  36.0*   MCV  92.1   MCH  31.5   MCHC  34.2   RDW  41.5   PLATELETCT  198   MPV  10.4     Recent Labs      07/22/18   1410   SODIUM  134*   POTASSIUM  3.8   CHLORIDE  100   CO2  20   GLUCOSE  100*   BUN  11   CREATININE  0.68   CALCIUM  9.0     Recent Labs      07/22/18   1410   ALTSGPT  16   ASTSGOT  24   ALKPHOSPHAT  91   TBILIRUBIN  0.4   LIPASE  11   GLUCOSE  100*     Recent Labs      07/22/18   1410   APTT  29.0   INR  1.03     Recent Labs      07/22/18   1410   BNPBTYPENAT  42         Lab Results   Component Value Date    TROPONINI <0.01 07/22/2018       Urinalysis:    No results found for: SPECGRAVITY, GLUCOSEUR, KETONES, NITRITE, WBCURINE, RBCURINE, BACTERIA, EPITHELCELL     Imaging:  ECHOCARDIOGRAM COMP W/O CONT   Final Result      CT-CTA CHEST WITH & W/O-POST PROCESS   Final Result      Unremarkable CT scan of the thoracic aorta without evidence of aneurysm or dissection.      Left lower lobe atelectasis or scarring.         DX-CHEST-LIMITED (1 VIEW)   Final Result         No acute cardiopulmonary abnormalities are identified.      NM-CARDIAC STRESS TEST    (Results Pending)       ekg reviewed by me- nsr rate 95. New LBBB      Assessment/Plan:  I anticipate this patient is appropriate for observation status at this time.    * Intercostal pain- (present on admission)   Assessment & Plan    Control bp    Check troponins    Check echo    Check pthall        Anxiety- (present on admission)   Assessment & Plan    Prn ativan        Acute bronchitis due to other specified organisms- (present on admission)   Assessment & Plan    Po doxycycline . Complete 5 days        Hypothyroidism due to acquired atrophy of thyroid- (present on admission)   Assessment & Plan    Check tsh    Cont synthroid        Essential hypertension- (present on admission)   Assessment & Plan    New dx    Started lisinopril 20mg po daily            VTE  prophylaxis: scd

## 2018-07-23 NOTE — ED NOTES
Med Rec complete per Pt at bedside  Allergies Reviewed  No ABX in the last 30 days  Ok per Pt to discuss medications with visitor/s present

## 2018-07-23 NOTE — PROGRESS NOTES
Bedside report received 2703. POC discussed with pt; Denies CP; No overnight cardiac events; Pending stress results; Pt refusing antibiotics while in hospital, but would like antibiotics on discharge; MD updated;  all questions answered at this time.

## 2018-07-23 NOTE — CONSULTS
Cardiology Initial Consult Note    Date of note:    7/23/2018      Consulting Physician: Lionel Main M.D.    Patient ID:    Name:   Melanie Marie     YOB: 1960  Age:   58 y.o.  female   MRN:   [id number]      Reason for Consultation: cardiomyopathy    HPI:  Melanie Marie is a 58 y.o.-year-old female with a history of hypothyroidism, celiac disease, and hip dysplasia who presented with arm numbness.  She reports she had caffeine once a day for the three days prior to admission.  On the morning of admission she had acute onset of bilateral arm and leg numbness associated with a loose stool ans nausea.  She also had 7 drinks the night before, which was abnormal for her.  Lastly, she has also been having a URI for the last week.    EKG showed LBBB.  Echo showed EF of 40%.  Trop was negative.      Patient denies chest pain, palpitations, dyspnea on exertion, pre-syncope, syncope, lower extremity swelling, PND or recent weight gain. She does report occasional mild orthopnea over the last couple months.       ROS  Constitutional: Negative for chills, fever, weakness, night sweats, weight gain and weight loss.   Eyes: Negative for double vision, vision loss in left eye and vision loss in right eye.   Cardiovascular: see HPI.  Respiratory: + cough   Musculoskeletal: Negative for joint swelling, muscle cramps, muscle weakness and myalgias. + hip pain.  Gastrointestinal: Negative for abdominal pain, hematochezia, hemorrhoids and melena.   Genitourinary: Negative for frequency and hematuria.   Neurological: Negative for dizziness, focal weakness, light-headedness, numbness, paresthesias.     All others reviewed and negative.    PMHx: celiac disease    PSHx: None    Prescriptions Prior to Admission   Medication Sig Dispense Refill Last Dose   • levothyroxine (SYNTHROID) 150 MCG Tab Take 150 mcg by mouth Every morning on an empty stomach.   7/22/2018 at 0900   • acetaminophen (TYLENOL) 500 MG Tab Take  "1,000 mg by mouth 2 times a day as needed (pain).   7/19/2018 at PRN   • metronidazole (METROGEL) 1 % gel Apply 1 Application to affected area(s) every day. face   > 3 days at PM   • estradiol (ESTRACE VAGINAL) 0.1 MG/GM vaginal cream Insert 1 g in vagina every 7 days. Wednesdays 7/18/2018 at PM           Allergies   Allergen Reactions   • Fosamax    • Lidocaine    • Sulfa Drugs          FH: CAD in uncle and mother, no early CAD.     Social History     Social History   • Marital status:      Spouse name: N/A   • Number of children: N/A   • Years of education: N/A     Occupational History   • Not on file.     Social History Main Topics   • Smoking status: Never Smoker   • Smokeless tobacco: Never Used   • Alcohol use Yes      Comment: 3-4x/week   • Drug use: No   • Sexual activity: Not on file     Other Topics Concern   • Not on file     Social History Narrative   • No narrative on file         Physical Exam  Body mass index is 22.34 kg/m².  Blood pressure 125/75, pulse 70, temperature 36.4 °C (97.5 °F), resp. rate 17, height 1.651 m (5' 5\"), weight 60.9 kg (134 lb 4.2 oz), SpO2 96 %, not currently breastfeeding.  Vitals:    07/22/18 1739 07/22/18 2133 07/22/18 2322 07/23/18 0344   BP: 141/70 134/77 134/75 125/75   Pulse: 82 90 72 70   Resp: 18  16 17   Temp: 36.8 °C (98.2 °F)  36.4 °C (97.5 °F) 36.4 °C (97.5 °F)   SpO2: 98% 97% 97% 96%   Weight: 60.9 kg (134 lb 4.2 oz)      Height:         Oxygen Therapy:  Pulse Oximetry: 96 %, O2 (LPM): 0, O2 Delivery: None (Room Air)    General: Well appearing and in no apparent distress  Eyes: nl conjunctiva  ENT: OP clear  Neck: JVP 4 cm H2O, no carotid bruits  Lungs: normal respiratory effort, CTAB  Heart: RRR, no murmurs, no rubs or gallops, no edema bilateral lower extremities. No LV/RV heave on cardiac palpatation. 2+ bilateral radial pulses.  2+ bilateral dp pulses.   Abdomen: soft, non tender, non distended, no masses, normal bowel sounds.  No " HSM.  Extremities/MSK: no clubbing, no cyanosis  Neurological: No focal sensory deficits  Psychiatric: Appropriate affect, A/O x 3  Skin: Warm extremities        Labs (personally reviewed and notable for):   Trop negative x 3      Cardiac Imaging and Procedures Review:    EKG dated 7/23/2018: My personal interpretation is NSR, LBBB    Echo dated 7/23/2018:   CONCLUSIONS  Moderately reduced left ventricular systolic function.  Left ventricular ejection fraction is visually estimated to be 40%.  There is akinesis of the anteroseptal wall.  There is hypokinesis of the inferior and anterior wall.  Grade I diastolic dysfunction.  The right ventricle was normal in size and function.  Mild mitral regurgitation.  Normal estimated right atrial pressure.    No prior study is available for comparison.       Radiology test Review:    Chest CTA:  Unremarkable CT scan of the thoracic aorta without evidence of aneurysm or dissection.    Left lower lobe atelectasis or scarring.    Reviewed by me and showed trace aortic calcium and no visible coronary artery calcifications.     Impression and Medical Decision Making:  # Chest pain  # cardiomyopathy, EF 40% with focal wall motion abnormalities.  ? Viral vs possible sarcoid given history of rheumatologic disease NOS.  Appears euvolemic at this time.   # LBBB        Recommendations:  # stress test to r/o ischemic cardiomyopathy  # start metoprolol XL 25mg PO daily, continue lisinopril, decrease to 10mg PO daily if hypotensive  # f/u BMP at home in 1 week  # f/u with [place] physicians for further cardiomyopathy work-up if indicated.  May benefit from cardiac MRI.  Chest CTA showed no hilar adenopathy per radiology read.   # ok for DC from cardiac perspective if stress testing negative.  Please let Dr. Chantell Enciso know if the test returns positive.     Cardiology will sign off.      Thank you for allowing me to participate in the care of this patient.  Please contact me with any  questions.      Tejas Santamaria MD  Cardiologist, [facility] [place]   [phone]

## 2018-07-23 NOTE — PROGRESS NOTES
Pt arrived to unit via wheelchair from main ER.  Pt's weight obtained on standup scale, see flowsheet.  Pt ambulated to hospital bed, gait steady.  Pt oriented to unit, call light.  Call light and belongings within reach

## 2018-07-23 NOTE — PROGRESS NOTES
Bedside report received 2795. POC discussed with pt; Pt denies CP; Pending completion of stress; No overnight cardiac events; Discussed pt in rounds with MD, APRN and ; all questions answered at this time.